# Patient Record
Sex: MALE | Race: WHITE
[De-identification: names, ages, dates, MRNs, and addresses within clinical notes are randomized per-mention and may not be internally consistent; named-entity substitution may affect disease eponyms.]

---
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
## 2020-11-27 ENCOUNTER — HOSPITAL ENCOUNTER (EMERGENCY)
Dept: HOSPITAL 77 - KA.ED | Age: 20
Discharge: HOME | End: 2020-11-27
Payer: COMMERCIAL

## 2020-11-27 DIAGNOSIS — S80.212A: ICD-10-CM

## 2020-11-27 DIAGNOSIS — S80.211A: ICD-10-CM

## 2020-11-27 DIAGNOSIS — S16.1XXA: Primary | ICD-10-CM

## 2020-11-27 DIAGNOSIS — S60.511A: ICD-10-CM
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
## 2020-11-27 DIAGNOSIS — S29.012A: ICD-10-CM
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
## 2020-11-27 LAB
BARBITURATES UR QL SCN: NEGATIVE
BENZODIAZ UR QL SCN: NEGATIVE
TCA UR-MCNC: NEGATIVE UG/ML
THC UR QL SCN>50 NG/ML: NEGATIVE
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
## 2022-01-16 ENCOUNTER — HOSPITAL ENCOUNTER (EMERGENCY)
Dept: HOSPITAL 77 - KA.ED | Age: 22
Discharge: HOME | End: 2022-01-16
Payer: COMMERCIAL

## 2022-01-16 DIAGNOSIS — K04.7: Primary | ICD-10-CM

## 2022-01-16 DIAGNOSIS — Z72.0: ICD-10-CM

## 2022-01-16 PROCEDURE — 99282 EMERGENCY DEPT VISIT SF MDM: CPT
